# Patient Record
Sex: FEMALE | Employment: FULL TIME | URBAN - METROPOLITAN AREA
[De-identification: names, ages, dates, MRNs, and addresses within clinical notes are randomized per-mention and may not be internally consistent; named-entity substitution may affect disease eponyms.]

---

## 2024-08-05 ENCOUNTER — OFFICE VISIT (OUTPATIENT)
Dept: URBAN - METROPOLITAN AREA CLINIC 63 | Facility: CLINIC | Age: 59
End: 2024-08-05

## 2024-08-05 RX ORDER — METRONIDAZOLE 375 MG/1
1 (ONE) CAPSULE ORAL
Qty: 0 | Refills: 2 | Status: ACTIVE | COMMUNITY
Start: 2016-01-28

## 2024-08-05 RX ORDER — CIPROFLOXACIN HCL 500 MG
1 (ONE) TABLET ORAL
Qty: 0 | Refills: 2 | Status: ACTIVE | COMMUNITY
Start: 2016-01-28

## 2024-08-05 NOTE — HPI-ZZZTODAY'S VISIT
Patient is a very pleasant 59-year-old female who presents for diarrhea and acid reflux.  She is a new patient to our practice and will be establishing with Dr. Brandt today.  Past medical history significant for.,  Hypothyroidism, lupus.  Past surgical history reviewed. Last colonoscopy Last endoscopy Family history noncontributory.